# Patient Record
Sex: FEMALE | Race: WHITE | NOT HISPANIC OR LATINO | Employment: OTHER | URBAN - METROPOLITAN AREA
[De-identification: names, ages, dates, MRNs, and addresses within clinical notes are randomized per-mention and may not be internally consistent; named-entity substitution may affect disease eponyms.]

---

## 2017-10-05 ENCOUNTER — HOSPITAL ENCOUNTER (EMERGENCY)
Facility: HOSPITAL | Age: 82
End: 2017-10-05
Attending: EMERGENCY MEDICINE
Payer: MEDICARE

## 2017-10-05 VITALS
HEIGHT: 59 IN | DIASTOLIC BLOOD PRESSURE: 74 MMHG | WEIGHT: 120 LBS | OXYGEN SATURATION: 99 % | BODY MASS INDEX: 24.19 KG/M2 | RESPIRATION RATE: 18 BRPM | SYSTOLIC BLOOD PRESSURE: 146 MMHG | TEMPERATURE: 98 F | HEART RATE: 89 BPM

## 2017-10-05 DIAGNOSIS — D64.9 ANEMIA, UNSPECIFIED TYPE: Primary | ICD-10-CM

## 2017-10-05 LAB
ABO + RH BLD: NORMAL
ALBUMIN SERPL BCP-MCNC: 3.1 G/DL
ALP SERPL-CCNC: 57 U/L
ALT SERPL W/O P-5'-P-CCNC: 7 U/L
ANION GAP SERPL CALC-SCNC: 12 MMOL/L
ANISOCYTOSIS BLD QL SMEAR: ABNORMAL
AST SERPL-CCNC: 13 U/L
BASOPHILS NFR BLD: 0 %
BILIRUB SERPL-MCNC: 0.2 MG/DL
BLD GP AB SCN CELLS X3 SERPL QL: NORMAL
BUN SERPL-MCNC: 22 MG/DL
CALCIUM SERPL-MCNC: 9 MG/DL
CHLORIDE SERPL-SCNC: 103 MMOL/L
CO2 SERPL-SCNC: 26 MMOL/L
CREAT SERPL-MCNC: 0.6 MG/DL
DIFFERENTIAL METHOD: ABNORMAL
EOSINOPHIL NFR BLD: 1 %
ERYTHROCYTE [DISTWIDTH] IN BLOOD BY AUTOMATED COUNT: 20.5 %
EST. GFR  (AFRICAN AMERICAN): >60 ML/MIN/1.73 M^2
EST. GFR  (NON AFRICAN AMERICAN): >60 ML/MIN/1.73 M^2
GLUCOSE SERPL-MCNC: 80 MG/DL
HCT VFR BLD AUTO: 26.6 %
HGB BLD-MCNC: 7.5 G/DL
HYPOCHROMIA BLD QL SMEAR: ABNORMAL
IRON SERPL-MCNC: 13 UG/DL
LYMPHOCYTES NFR BLD: 33 %
MCH RBC QN AUTO: 19.7 PG
MCHC RBC AUTO-ENTMCNC: 28.2 G/DL
MCV RBC AUTO: 70 FL
MONOCYTES NFR BLD: 5 %
NEUTROPHILS NFR BLD: 61 %
PLATELET # BLD AUTO: 476 K/UL
PMV BLD AUTO: 9.2 FL
POLYCHROMASIA BLD QL SMEAR: ABNORMAL
POTASSIUM SERPL-SCNC: 4 MMOL/L
PROT SERPL-MCNC: 7.1 G/DL
RBC # BLD AUTO: 3.81 M/UL
SATURATED IRON: 3 %
SODIUM SERPL-SCNC: 141 MMOL/L
TOTAL IRON BINDING CAPACITY: 395 UG/DL
TRANSFERRIN SERPL-MCNC: 267 MG/DL
WBC # BLD AUTO: 8.17 K/UL

## 2017-10-05 PROCEDURE — 83540 ASSAY OF IRON: CPT

## 2017-10-05 PROCEDURE — 86850 RBC ANTIBODY SCREEN: CPT

## 2017-10-05 PROCEDURE — 85025 COMPLETE CBC W/AUTO DIFF WBC: CPT

## 2017-10-05 PROCEDURE — 86900 BLOOD TYPING SEROLOGIC ABO: CPT

## 2017-10-05 PROCEDURE — 82607 VITAMIN B-12: CPT

## 2017-10-05 PROCEDURE — 93010 ELECTROCARDIOGRAM REPORT: CPT | Mod: ,,, | Performed by: INTERNAL MEDICINE

## 2017-10-05 PROCEDURE — 82746 ASSAY OF FOLIC ACID SERUM: CPT

## 2017-10-05 PROCEDURE — 93005 ELECTROCARDIOGRAM TRACING: CPT

## 2017-10-05 PROCEDURE — 80053 COMPREHEN METABOLIC PANEL: CPT

## 2017-10-05 PROCEDURE — 99285 EMERGENCY DEPT VISIT HI MDM: CPT

## 2017-10-05 RX ORDER — MEMANTINE HYDROCHLORIDE 14 MG/1
CAPSULE, EXTENDED RELEASE ORAL
COMMUNITY

## 2017-10-05 RX ORDER — METHENAMINE HIPPURATE 1000 MG/1
1 TABLET ORAL 2 TIMES DAILY
COMMUNITY

## 2017-10-05 RX ORDER — ACETAMINOPHEN 500 MG
TABLET ORAL
COMMUNITY

## 2017-10-05 NOTE — DISCHARGE INSTRUCTIONS
Please take the iron tablets, Fergon, twice a day with meals.  Please take a Centrum Silver multivitamin every day.  Please follow-up with her primary care doctor in 7 days.  Return immediately if she gets worse or if new problems develop.  Rest.

## 2017-10-05 NOTE — ED PROVIDER NOTES
Encounter Date: 10/5/2017    SCRIBE #1 NOTE: I, Natalia Epstein, am scribing for, and in the presence of,  Michael Clark. I have scribed the following portions of the note - Other sections scribed: HPI and ROS.       History     Chief Complaint   Patient presents with    Abnormal Lab     Pt sent to ED for evaluation of low H&H (6.8& 23.1)     CC: Abnormal Lab    HPI: The pt is an 87 y.o. F with a PMHx of dementia, diabetes mellitus type 2, HTN, and depression and no PSHx who presents to the ED for evaluation of an abnormal lab. Pt's son states that pt had her yearly bloodwork done at [facility] and they found that her blood count was low (hemoglobin: 6.88 and hematocrit: 23.1). No attempted treatments. No alleviating factors. Pt otherwise denies fever, emesis, nausea, chest pain, abdominal pain, dysuria, and other associated symptoms. Hx of pt is limited by Alzheimer's.       The history is provided by a relative. The history is limited by the condition of the patient. No  was used.     Review of patient's allergies indicates:   Allergen Reactions    Aricept [donepezil] Diarrhea and Other (See Comments)     Tremors     Penicillins Rash     Past Medical History:   Diagnosis Date    Dementia     Depression     Diabetes mellitus, type 2     Hypertension      History reviewed. No pertinent surgical history.  History reviewed. No pertinent family history.  Social History   Substance Use Topics    Smoking status: Never Smoker    Smokeless tobacco: Never Used    Alcohol use No     Review of Systems   Constitutional: Negative for chills, diaphoresis and fever.   HENT: Negative for ear pain and sore throat.    Eyes: Negative for redness.        (-) eye problems   Respiratory: Negative for cough and shortness of breath.    Cardiovascular: Negative for chest pain.   Gastrointestinal: Negative for abdominal pain, diarrhea, nausea and vomiting.   Genitourinary: Negative for dysuria.    Musculoskeletal: Negative for back pain.        (-) arm or leg pain   Skin: Negative for rash.   Neurological: Negative for headaches.       Physical Exam     Initial Vitals [10/05/17 1346]   BP Pulse Resp Temp SpO2   (!) 146/65 72 18 97.9 °F (36.6 °C) 97 %      MAP       92         Physical Exam  The patient was examined specifically for the following:   General:No significant distress, Good color, Warm and dry. Head and neck:Scalp atraumatic, Neck supple. Neurological:Appropriate conversation, Gross motor deficits. Eyes:Conjugate gaze, Clear corneas. ENT: No epistaxis. Cardiac: Regular rate and rhythm, Grossly normal heart tones. Pulmonary: Wheezing, Rales. Gastrointestinal: Abdominal tenderness, Abdominal distention. Musculoskeletal: Extremity deformity, Apparent pain with range of motion of the joints. Skin: Rash.   The findings on examination were normal except for the following: Patient is demented.  Her cheeks are pink.  She is in no distress.  There is no tachycardia.  The abdomen is nontender.  Rectal examination reveals light yellow guaiac-negative stool  ED Course   Procedures  Labs Reviewed   CBC W/ AUTO DIFFERENTIAL - Abnormal; Notable for the following:        Result Value    RBC 3.81 (*)     Hemoglobin 7.5 (*)     Hematocrit 26.6 (*)     MCV 70 (*)     MCH 19.7 (*)     MCHC 28.2 (*)     RDW 20.5 (*)     Platelets 476 (*)     All other components within normal limits   COMPREHENSIVE METABOLIC PANEL - Abnormal; Notable for the following:     Albumin 3.1 (*)     ALT 7 (*)     All other components within normal limits   IRON AND TIBC - Abnormal; Notable for the following:     Iron 13 (*)     Saturated Iron 3 (*)     All other components within normal limits   VITAMIN B12   FOLATE   TYPE & SCREEN     Medical decision making: Given the above, this patient presents to emergency room with a low H&H on yearly lab work.  Her hemoglobin was 6.8 by report in the emergency room here to 7.5.  I will discharge  this patient on iron and multivitamins.  The stool is guaiac-negative.  I discussed the case with , asthma medicine who feels that the patient can be safely discharged.  There is no rectal bleeding or GI bleeding.  The patient looks pain she looks stable.  Vital signs are stable.  She is demented.  EKG Readings: (Independently Interpreted)   This patient is in a normal sinus rhythm with a heart rate is 74.  The MI QRS and QT intervals are normal.  There is no evidence of acute myocardial infarction or malignant arrhythmia.  There is poor R-wave progression across precordium.                     Scribe Attestation:   Scribe #1: I performed the above scribed service and the documentation accurately describes the services I performed. I attest to the accuracy of the note.    Attending Attestation:           Physician Attestation for Scribe:  Physician Attestation Statement for Scribe #1: I, Michael Clark MD, reviewed documentation, as scribed by Natalia Epstein in my presence, and it is both accurate and complete.                 ED Course      Clinical Impression:   The encounter diagnosis was Anemia, unspecified type.                           Michael Clark MD  10/06/17 1762

## 2017-10-06 LAB
FOLATE SERPL-MCNC: 11.7 NG/ML
VIT B12 SERPL-MCNC: 211 PG/ML